# Patient Record
Sex: FEMALE | Race: AMERICAN INDIAN OR ALASKA NATIVE
[De-identification: names, ages, dates, MRNs, and addresses within clinical notes are randomized per-mention and may not be internally consistent; named-entity substitution may affect disease eponyms.]

---

## 2019-07-02 ENCOUNTER — HOSPITAL ENCOUNTER (EMERGENCY)
Dept: HOSPITAL 5 - ED | Age: 29
Discharge: HOME | End: 2019-07-02
Payer: COMMERCIAL

## 2019-07-02 VITALS — DIASTOLIC BLOOD PRESSURE: 78 MMHG | SYSTOLIC BLOOD PRESSURE: 128 MMHG

## 2019-07-02 DIAGNOSIS — O99.511: ICD-10-CM

## 2019-07-02 DIAGNOSIS — Z88.8: ICD-10-CM

## 2019-07-02 DIAGNOSIS — J45.909: ICD-10-CM

## 2019-07-02 DIAGNOSIS — O20.0: Primary | ICD-10-CM

## 2019-07-02 DIAGNOSIS — Z3A.01: ICD-10-CM

## 2019-07-02 LAB
BACTERIA #/AREA URNS HPF: (no result) /HPF
BASOPHILS # (AUTO): 0.2 K/MM3 (ref 0–0.1)
BASOPHILS NFR BLD AUTO: 2.8 % (ref 0–1.8)
BILIRUB UR QL STRIP: (no result)
BLOOD UR QL VISUAL: (no result)
EOSINOPHIL # BLD AUTO: 0.4 K/MM3 (ref 0–0.4)
EOSINOPHIL NFR BLD AUTO: 4.5 % (ref 0–4.3)
HCT VFR BLD CALC: 38.1 % (ref 30.3–42.9)
HGB BLD-MCNC: 13.4 GM/DL (ref 10.1–14.3)
LYMPHOCYTES # BLD AUTO: 1.8 K/MM3 (ref 1.2–5.4)
LYMPHOCYTES NFR BLD AUTO: 20.1 % (ref 13.4–35)
MCHC RBC AUTO-ENTMCNC: 35 % (ref 30–34)
MCV RBC AUTO: 94 FL (ref 79–97)
MONOCYTES # (AUTO): 0.7 K/MM3 (ref 0–0.8)
MONOCYTES % (AUTO): 7.4 % (ref 0–7.3)
MUCOUS THREADS #/AREA URNS HPF: (no result) /HPF
PH UR STRIP: 6 [PH] (ref 5–7)
PLATELET # BLD: 267 K/MM3 (ref 140–440)
PROT UR STRIP-MCNC: (no result) MG/DL
RBC # BLD AUTO: 4.04 M/MM3 (ref 3.65–5.03)
RBC #/AREA URNS HPF: 3 /HPF (ref 0–6)
UROBILINOGEN UR-MCNC: < 2 MG/DL (ref ?–2)
WBC #/AREA URNS HPF: 1 /HPF (ref 0–6)

## 2019-07-02 PROCEDURE — 86901 BLOOD TYPING SEROLOGIC RH(D): CPT

## 2019-07-02 PROCEDURE — 76817 TRANSVAGINAL US OBSTETRIC: CPT

## 2019-07-02 PROCEDURE — 76801 OB US < 14 WKS SINGLE FETUS: CPT

## 2019-07-02 PROCEDURE — 96372 THER/PROPH/DIAG INJ SC/IM: CPT

## 2019-07-02 PROCEDURE — 86870 RBC ANTIBODY IDENTIFICATION: CPT

## 2019-07-02 PROCEDURE — 81001 URINALYSIS AUTO W/SCOPE: CPT

## 2019-07-02 PROCEDURE — 86900 BLOOD TYPING SEROLOGIC ABO: CPT

## 2019-07-02 PROCEDURE — 36415 COLL VENOUS BLD VENIPUNCTURE: CPT

## 2019-07-02 PROCEDURE — 99284 EMERGENCY DEPT VISIT MOD MDM: CPT

## 2019-07-02 PROCEDURE — 84702 CHORIONIC GONADOTROPIN TEST: CPT

## 2019-07-02 PROCEDURE — 86850 RBC ANTIBODY SCREEN: CPT

## 2019-07-02 PROCEDURE — 85461 HEMOGLOBIN FETAL: CPT

## 2019-07-02 PROCEDURE — 85025 COMPLETE CBC W/AUTO DIFF WBC: CPT

## 2019-07-02 NOTE — ULTRASOUND REPORT
ULTRASOUND OBSTETRIC 



Indication:

preg with abd pain/bleeding. Reported estimated gestational age of 5 weeks 2 days.



Findings:

No intrauterine pregnancy or gestational sac is identified at this time.



The ovaries are normal. There is no free fluid.



Impression:

No intrauterine pregnancy identified. No significant abnormality of the visualized pelvic structures.
 Based on the reported estimated gestational age, follow-up ultrasound should be considered in one to
 2 weeks to reevaluate for intrauterine pregnancy.



Signer Name: Bjorn Rivero MD 

 Signed: 7/2/2019 6:13 PM 

 Workstation Name: [id number]

## 2019-07-02 NOTE — ULTRASOUND REPORT
ULTRASOUND OBSTETRIC 



Indication:

preg with abd pain/bleeding. Reported estimated gestational age of 5 weeks 2 days.



Findings:

No intrauterine pregnancy or gestational sac is identified at this time.



The ovaries are normal. There is no free fluid.



Impression:

No intrauterine pregnancy identified. No significant abnormality of the visualized pelvic structures.
 Based on the reported estimated gestational age, follow-up ultrasound should be considered in one to
 2 weeks to reevaluate for intrauterine pregnancy.



Signer Name: Bjorn Rivero MD 

 Signed: 7/2/2019 6:13 PM 

 Workstation Name: [id number]

## 2019-07-02 NOTE — EMERGENCY DEPARTMENT REPORT
Blank Doc





- Documentation


Documentation: 





This is a 28-year-old female that rpesents with pelvic cramping and vaginal bl

eeding. Stated is not sure how long of pregnancy.








This initial assessment/diagnostic orders/clinical plan/treatment(s) is/are 

subject to change based on patient's health status, clinical progression and re-

assessment by fellow clinical providers in the ED.  Further treatment and workup

at subsequent clinical providers discretion.  Patient/guardians urged not to 

elope from the ED as their condition may be serious if not clinically assessed 

and managed.  Initial orders include:


1- Patient sent to [facility] for further evaluation and treatment


2-labs


3- UA

## 2019-07-02 NOTE — EMERGENCY DEPARTMENT REPORT
<MARIAH FOWLER - Last Filed: 07/02/19 16:40>





ED Pregnancy HPI





- General


Chief complaint: Abdominal Pain


Stated complaint: PREGNANT/SPOTTING


Time Seen by Provider: 07/02/19 13:50


Source: patient


Mode of arrival: Ambulatory


Limitations: No Limitations





- History of Present Illness


Initial comments: 





Patient is a 28-year-old  female who is presenting with spotting for the 

last 3 days.  Patient states 2 weeks ago she did have a confirmed pregnancy.  

ACEs Quant was too low to see intrauterine pregnancy.  Patient believes her 

Quant was 198.  Patient states that she has some crampy low abdominal pain and 

spotting.  Patient states she feels some mild pressure.  Patient is concerned 

that she is having a miscarriage.  Her previous visit when she had her pregnancy

confirmed there was no bleeding.





- Related Data


                                    Allergies











Allergy/AdvReac Type Severity Reaction Status Date / Time


 


naproxen [From Aleve] Allergy  Swelling Verified 07/02/19 13:40














ED Review of Systems


Comment: All other systems reviewed and negative





ED Past Medical Hx





- Past Medical History


Previous Medical History?: Yes


Hx Asthma: Yes





- Surgical History


Past Surgical History?: Yes


Additional Surgical History: c section





- Social History


Smoking Status: Never Smoker


Substance Use Type: None





ED Physical Exam





- General


Limitations: No Limitations


General appearance: alert, in no apparent distress





- Head


Head exam: Present: atraumatic, normocephalic





- Eye


Eye exam: Present: normal appearance





- ENT


ENT exam: Present: mucous membranes moist





- Neck


Neck exam: Present: normal inspection





- Respiratory


Respiratory exam: Present: normal lung sounds bilaterally.  Absent: respiratory 

distress, wheezes, rales, rhonchi





- Cardiovascular


Cardiovascular Exam: Present: regular rate, normal rhythm.  Absent: systolic 

murmur, diastolic murmur, rubs, gallop





- GI/Abdominal


GI/Abdominal exam: Present: soft, tenderness, normal bowel sounds.  Absent: 

distended, guarding, rebound





- Extremities Exam


Extremities exam: Present: normal inspection





- Back Exam


Back exam: Present: normal inspection





- Neurological Exam


Neurological exam: Present: alert, oriented X3





- Psychiatric


Psychiatric exam: Present: normal affect, normal mood





- Skin


Skin exam: Present: warm, dry, intact, normal color.  Absent: rash





ED Medical Decision Making





- Lab Data


Result diagrams: 


                                 07/02/19 13:55











                                   Lab Results











  07/02/19 07/02/19 07/02/19 Range/Units





  13:55 13:55 13:55 


 


WBC  8.8    (4.5-11.0)  K/mm3


 


RBC  4.04    (3.65-5.03)  M/mm3


 


Hgb  13.4    (10.1-14.3)  gm/dl


 


Hct  38.1    (30.3-42.9)  %


 


MCV  94    (79-97)  fl


 


MCH  33 H    (28-32)  pg


 


MCHC  35 H    (30-34)  %


 


RDW  14.1    (13.2-15.2)  %


 


Plt Count  267    (140-440)  K/mm3


 


Lymph % (Auto)  20.1    (13.4-35.0)  %


 


Mono % (Auto)  7.4 H    (0.0-7.3)  %


 


Eos % (Auto)  4.5 H    (0.0-4.3)  %


 


Baso % (Auto)  2.8 H    (0.0-1.8)  %


 


Lymph #  1.8    (1.2-5.4)  K/mm3


 


Mono #  0.7    (0.0-0.8)  K/mm3


 


Eos #  0.4    (0.0-0.4)  K/mm3


 


Baso #  0.2 H    (0.0-0.1)  K/mm3


 


Seg Neutrophils %  65.2    (40.0-70.0)  %


 


Seg Neutrophils #  5.7    (1.8-7.7)  K/mm3


 


HCG, Quant   1955 H   (0-4)  mIU/mL


 


Urine Color     (Yellow)  


 


Urine Turbidity     (Clear)  


 


Urine pH     (5.0-7.0)  


 


Ur Specific Gravity     (1.003-1.030)  


 


Urine Protein     (Negative)  mg/dL


 


Urine Glucose (UA)     (Negative)  mg/dL


 


Urine Ketones     (Negative)  mg/dL


 


Urine Blood     (Negative)  


 


Urine Nitrite     (Negative)  


 


Urine Bilirubin     (Negative)  


 


Urine Urobilinogen     (<2.0)  mg/dL


 


Ur Leukocyte Esterase     (Negative)  


 


Urine WBC (Auto)     (0.0-6.0)  /HPF


 


Urine RBC (Auto)     (0.0-6.0)  /HPF


 


U Epithel Cells (Auto)     (0-13.0)  /HPF


 


Urine Bacteria (Auto)     (Negative)  /HPF


 


Urine Mucus     /HPF


 


Blood Type    A NEGATIVE  


 


Antibody Screen    Positive  














  07/02/19 Range/Units





  Unknown 


 


WBC   (4.5-11.0)  K/mm3


 


RBC   (3.65-5.03)  M/mm3


 


Hgb   (10.1-14.3)  gm/dl


 


Hct   (30.3-42.9)  %


 


MCV   (79-97)  fl


 


MCH   (28-32)  pg


 


MCHC   (30-34)  %


 


RDW   (13.2-15.2)  %


 


Plt Count   (140-440)  K/mm3


 


Lymph % (Auto)   (13.4-35.0)  %


 


Mono % (Auto)   (0.0-7.3)  %


 


Eos % (Auto)   (0.0-4.3)  %


 


Baso % (Auto)   (0.0-1.8)  %


 


Lymph #   (1.2-5.4)  K/mm3


 


Mono #   (0.0-0.8)  K/mm3


 


Eos #   (0.0-0.4)  K/mm3


 


Baso #   (0.0-0.1)  K/mm3


 


Seg Neutrophils %   (40.0-70.0)  %


 


Seg Neutrophils #   (1.8-7.7)  K/mm3


 


HCG, Quant   (0-4)  mIU/mL


 


Urine Color  Yellow  (Yellow)  


 


Urine Turbidity  Slightly-cloudy  (Clear)  


 


Urine pH  6.0  (5.0-7.0)  


 


Ur Specific Gravity  1.020  (1.003-1.030)  


 


Urine Protein  <15 mg/dl  (Negative)  mg/dL


 


Urine Glucose (UA)  Neg  (Negative)  mg/dL


 


Urine Ketones  Neg  (Negative)  mg/dL


 


Urine Blood  Neg  (Negative)  


 


Urine Nitrite  Neg  (Negative)  


 


Urine Bilirubin  Neg  (Negative)  


 


Urine Urobilinogen  < 2.0  (<2.0)  mg/dL


 


Ur Leukocyte Esterase  Tr  (Negative)  


 


Urine WBC (Auto)  1.0  (0.0-6.0)  /HPF


 


Urine RBC (Auto)  3.0  (0.0-6.0)  /HPF


 


U Epithel Cells (Auto)  8.0  (0-13.0)  /HPF


 


Urine Bacteria (Auto)  1+  (Negative)  /HPF


 


Urine Mucus  Few  /HPF


 


Blood Type   


 


Antibody Screen   














ED Disposition


Clinical Impression: 


 Threatened miscarriage, Vaginal bleeding affecting early pregnancy





Disposition: DC-01 TO HOME OR SELFCARE


Condition: Stable


Instructions:  Abdominal Pain (ED), Threatened Miscarriage (ED)


Additional Instructions: 


Have repeat hCG quant labs in 48 hours with OB/GYN or ER.





Your hCG quantitative on this visit was 1955.





Remain on bed rest.





Follow up with OB/GYN in 24-48 hours.





Return to ER if increased vaginal bleeding, abdominal pain, and low back pain.


Referrals: 


[facility] [Primary Care Provider] - 3-5 Days


[facility] [Provider Group] - 3-5 Days


[facility] [Provider Group] - 3-5 Days


[facility] [Provider Group] - 3-5 Days


Forms:  Work/School Release Form(ED)





<ELINA WANG - Last Filed: 07/02/19 19:49>





ED Review of Systems


ROS: 


Stated complaint: PREGNANT/SPOTTING


Other details as noted in HPI








ED Course





                                   Vital Signs











  07/02/19 07/02/19





  13:47 17:54


 


Temperature 98.2 F 98.0 F


 


Pulse Rate 89 84


 


Respiratory 16 14





Rate  


 


Blood Pressure 101/52 


 


Blood Pressure  128/78





[Right]  


 


O2 Sat by Pulse 99 98





Oximetry  














ED Medical Decision Making





- Lab Data


Result diagrams: 


                                 07/02/19 13:55








- Radiology Data


Radiology results: report reviewed





ULTRASOUND OBSTETRIC 





Indication: 


preg with abd pain/bleeding. Reported estimated gestational age of 5 weeks 2 

days. 





Findings: 


No intrauterine pregnancy or gestational sac is identified at this time. 





The ovaries are normal. There is no free fluid. 





Impression: 


No intrauterine pregnancy identified. No significant abnormality of the 

visualized pelvic 


structures. Based on the reported estimated gestational age, follow-up 

ultrasound should be 


considered in one to 2 weeks to reevaluate for intrauterine pregnancy. 








- Medical Decision Making





This patient was received from [facility] at 1700 pending OB ultrasound.  I 

have review ultrasound report.  Her findings of No intrauterine pregnancy 

identified. No significant abnormality of the visualized pelvic structures. 

Based on the reported estimated gestational age, follow-up ultrasound should be 


considered in one to 2 weeks to reevaluate for intrauterine pregnancy.  

Patient's hCG quantitative is 1955 she is Rh-.  She was given RhoGAM treatment. 

 She was instructed to have repeat levels and possible ultrasound with OB/GYN or

 return to the emergency room.


Critical care attestation.: 


If time is entered above; I have spent that time in minutes in the direct care 

of this critically ill patient, excluding procedure time.








ED Disposition


Is pt being admited?: No


Does the pt Need Aspirin: No


Time of Disposition: 19:49

## 2019-07-18 ENCOUNTER — HOSPITAL ENCOUNTER (EMERGENCY)
Dept: HOSPITAL 5 - ED | Age: 29
Discharge: HOME | End: 2019-07-18
Payer: COMMERCIAL

## 2019-07-18 VITALS — DIASTOLIC BLOOD PRESSURE: 51 MMHG | SYSTOLIC BLOOD PRESSURE: 113 MMHG

## 2019-07-18 DIAGNOSIS — J45.909: ICD-10-CM

## 2019-07-18 DIAGNOSIS — O99.331: ICD-10-CM

## 2019-07-18 DIAGNOSIS — O20.0: Primary | ICD-10-CM

## 2019-07-18 DIAGNOSIS — O21.9: ICD-10-CM

## 2019-07-18 DIAGNOSIS — Z3A.01: ICD-10-CM

## 2019-07-18 DIAGNOSIS — O99.511: ICD-10-CM

## 2019-07-18 DIAGNOSIS — F17.200: ICD-10-CM

## 2019-07-18 DIAGNOSIS — Z88.5: ICD-10-CM

## 2019-07-18 LAB
BASOPHILS # (AUTO): 0.1 K/MM3 (ref 0–0.1)
BASOPHILS NFR BLD AUTO: 1.1 % (ref 0–1.8)
BILIRUB UR QL STRIP: (no result)
BLOOD UR QL VISUAL: (no result)
EOSINOPHIL # BLD AUTO: 0.4 K/MM3 (ref 0–0.4)
EOSINOPHIL NFR BLD AUTO: 4.6 % (ref 0–4.3)
HCT VFR BLD CALC: 40 % (ref 30.3–42.9)
HGB BLD-MCNC: 13.8 GM/DL (ref 10.1–14.3)
LYMPHOCYTES # BLD AUTO: 1.9 K/MM3 (ref 1.2–5.4)
LYMPHOCYTES NFR BLD AUTO: 23.8 % (ref 13.4–35)
MCHC RBC AUTO-ENTMCNC: 35 % (ref 30–34)
MCV RBC AUTO: 95 FL (ref 79–97)
MONOCYTES # (AUTO): 0.6 K/MM3 (ref 0–0.8)
MONOCYTES % (AUTO): 7.3 % (ref 0–7.3)
PH UR STRIP: 7 [PH] (ref 5–7)
PLATELET # BLD: 242 K/MM3 (ref 140–440)
PROT UR STRIP-MCNC: (no result) MG/DL
RBC # BLD AUTO: 4.22 M/MM3 (ref 3.65–5.03)
RBC #/AREA URNS HPF: 1 /HPF (ref 0–6)
UROBILINOGEN UR-MCNC: < 2 MG/DL (ref ?–2)
WBC #/AREA URNS HPF: 1 /HPF (ref 0–6)

## 2019-07-18 PROCEDURE — 86870 RBC ANTIBODY IDENTIFICATION: CPT

## 2019-07-18 PROCEDURE — 96372 THER/PROPH/DIAG INJ SC/IM: CPT

## 2019-07-18 PROCEDURE — 76801 OB US < 14 WKS SINGLE FETUS: CPT

## 2019-07-18 PROCEDURE — 85025 COMPLETE CBC W/AUTO DIFF WBC: CPT

## 2019-07-18 PROCEDURE — 76817 TRANSVAGINAL US OBSTETRIC: CPT

## 2019-07-18 PROCEDURE — 86900 BLOOD TYPING SEROLOGIC ABO: CPT

## 2019-07-18 PROCEDURE — 81001 URINALYSIS AUTO W/SCOPE: CPT

## 2019-07-18 PROCEDURE — 84702 CHORIONIC GONADOTROPIN TEST: CPT

## 2019-07-18 PROCEDURE — 86901 BLOOD TYPING SEROLOGIC RH(D): CPT

## 2019-07-18 PROCEDURE — 86850 RBC ANTIBODY SCREEN: CPT

## 2019-07-18 PROCEDURE — 36415 COLL VENOUS BLD VENIPUNCTURE: CPT

## 2019-07-18 NOTE — EVENT NOTE
ED Screening Note


ED Screening Note: 





pt presents with vaginal spotting


no dysuria


lower back pain and pelvic pain


pt is currently 7 weeks pregnant 


does not have a OB


/P:1/A:2


PMHx asthma 


allergy: aleve and zofran 


LNMP: May 26th 


pt is taking a prenatal vitamin 





+ smoker before pregnancy


+drinker before pregnancy 


no drug use








This initial assessment/diagnostic orders/clinical plan/treatment(s) is/are 

subject to change based on patients health status, clinical progression and re-

assessment by fellow clinical providers in the ED. Further treatment and workup 

at subsequent clinical providers discretion. Patient/guardian urged not to elope

from the ED as their condition may be serious if not clinically assessed and 

managed. 





Initial orders include: labs, UA, US

## 2019-07-18 NOTE — ULTRASOUND REPORT
ULTRASOUND OBSTETRIC 



Indication:

7 weeks preg, vag bleeding



COMPARISON: 7/2/2019



Findings: Transabdominal and transvaginal imaging is performed.

There is a single, living intrauterine pregnancy. 



Crown-rump length = 0.9 cm = 7 weeks, 0 day(s).



Fetal heart rate is 138 beats per minute.



The gestational sac and fetal pole or in the lower uterine segment.



Right ovary is normal in appearance. There is a 2.7 cm complex cyst in the left ovary.



There is no free fluid.



Impression:

Single, living intrauterine pregnancy with estimated sonographic age of 7 weeks, 0 day(s).



The gestational sac is in the lower uterine segment. Continued close follow-up is suggested.



Signer Name: Vidal Pina MD 

Signed: 7/18/2019 4:44 PM

 Workstation Name: [id number]

## 2019-07-18 NOTE — EMERGENCY DEPARTMENT REPORT
ED Pregnancy HPI





- General


Chief complaint: Vaginal Bleeding


Stated complaint: VAG BLEEDING/PELVIC/BACK PAIN (PREG)


Time Seen by Provider: 19 14:46


Source: patient


Mode of arrival: Ambulatory


Limitations: No Limitations





- History of Present Illness


Initial comments: 





This is a 29-year-old American female presents to the emergency room with 

vaginal bleeding and pelvic pain for 3 days.  Has medical history of asthma.  

Patient reports last menstrual period was 2019,  83 miscarriages.  

Patient states symptoms started with sharp pelvic pain radiating to her back.  

She reports spotting with small tissue with wipes.  She is 7 weeks pregnant 

which was confirmed by a hospital 1-2 weeks ago.  Patient states she is not 

followed by an OB/GYN at this time.  He also reports nausea or vomiting.  She 

denies vaginal discharge, any urinary frequency, urgency, or dysuria.


MD Complaint: abdominal pain, vaginal bleeding


Onset/Timing: 3


-: days(s)


Location: pelvis


Radiation: back


Severity: moderate


Severity scale (0 -10): 4


Quality: sharp


Consistency: intermittent


Improves with: none


Worsens with: none


Associated symptoms: nausea/vomiting, vaginal bleeding, abdominal pain.  denies:

vaginal discharge, headache, vision changes, malaise, dysparuenia, rash, 

shortness of breath, syncope, weakness


Vaginal bleeding: light


Pregnant:: Yes


Number of weeks pregnant: 7


OB History - Current Pregnancy: no complications


OB History - Previous Pregnancies: miscarriage


Last menstrual period: 19


Pre- care: none





- Related Data


: 4


Para: 1


Ab: 3 (miscarriages)


                                  Previous Rx's











 Medication  Instructions  Recorded  Last Taken  Type


 


Doxylamine Succinate [Unisom] 25 mg PO HS #30 tablet 19 Unknown Rx


 


Prenatal 21/Iron Fu/Folic Acid 1 each PO DAILY #30 tablet 19 Unknown Rx





[Prenatal Complete Caplet]    


 


Pyridoxine HCl (Vitamin B6) 25 mg PO QHS #30 tablet 19 Unknown Rx





[Pyridoxine HCl]    











                                    Allergies











Allergy/AdvReac Type Severity Reaction Status Date / Time


 


naproxen [From Aleve] Allergy  Swelling Verified 19 13:40


 


ondansetron [From Zofran] Allergy  Swelling Verified 19 14:29














ED Review of Systems


ROS: 


Stated complaint: VAG BLEEDING/PELVIC/BACK PAIN (PREG)


Other details as noted in HPI





Constitutional: denies: chills, fever


Respiratory: denies: cough, shortness of breath, wheezing


Cardiovascular: denies: chest pain, palpitations


Gastrointestinal: abdominal pain, nausea, vomiting.  denies: diarrhea


Genitourinary: other (bleeding during pregnancy).  denies: urgency, dysuria, 

discharge


Musculoskeletal: back pain.  denies: joint swelling, arthralgia


Skin: denies: rash, lesions


Neurological: denies: headache, weakness, paresthesias


Psychiatric: denies: anxiety, depression


Hematological/Lymphatic: denies: easy bleeding, easy bruising





ED Past Medical Hx





- Past Medical History


Previous Medical History?: Yes


Hx Asthma: Yes





- Surgical History


Past Surgical History?: Yes


Additional Surgical History: c section





- Social History


Smoking Status: Heavy Tobacco Smoker


Substance Use Type: None





- Medications


Home Medications: 


                                Home Medications











 Medication  Instructions  Recorded  Confirmed  Last Taken  Type


 


Doxylamine Succinate [Unisom] 25 mg PO HS #30 tablet 19  Unknown Rx


 


Prenatal 21/Iron Fu/Folic Acid 1 each PO DAILY #30 tablet 19  Unknown Rx





[Prenatal Complete Caplet]     


 


Pyridoxine HCl (Vitamin B6) 25 mg PO QHS #30 tablet 19  Unknown Rx





[Pyridoxine HCl]     














ED Physical Exam





- General


Limitations: No Limitations


General appearance: alert, in no apparent distress, obese (morbidly)





- Respiratory


Respiratory exam: Present: normal lung sounds bilaterally.  Absent: respiratory 

distress





- Cardiovascular


Cardiovascular Exam: Present: regular rate, normal rhythm.  Absent: systolic 

murmur, diastolic murmur, rubs, gallop





- GI/Abdominal


GI/Abdominal exam: Present: soft, normal bowel sounds.  Absent: distended, 

tenderness, guarding, rebound, rigid





- Back Exam


Back exam: Absent: CVA tenderness (R), CVA tenderness (L)





- Neurological Exam


Neurological exam: Present: alert, oriented X3, normal gait





- Psychiatric


Psychiatric exam: Present: normal affect, normal mood





- Skin


Skin exam: Present: warm, dry, intact, normal color.  Absent: rash





ED Course





                                   Vital Signs











  19





  14:46


 


Temperature 98.2 F


 


Pulse Rate 94 H


 


Respiratory 16





Rate 


 


Blood Pressure 125/70


 


O2 Sat by Pulse 98





Oximetry 














ED Medical Decision Making





- Lab Data


Result diagrams: 


                                 19 15:21











                                   Lab Results











  19/19 Range/Units





  15:21 15:21 16:00 


 


WBC  8.0    (4.5-11.0)  K/mm3


 


RBC  4.22    (3.65-5.03)  M/mm3


 


Hgb  13.8    (10.1-14.3)  gm/dl


 


Hct  40.0    (30.3-42.9)  %


 


MCV  95    (79-97)  fl


 


MCH  33 H    (28-32)  pg


 


MCHC  35 H    (30-34)  %


 


RDW  14.2    (13.2-15.2)  %


 


Plt Count  242    (140-440)  K/mm3


 


Lymph % (Auto)  23.8    (13.4-35.0)  %


 


Mono % (Auto)  7.3    (0.0-7.3)  %


 


Eos % (Auto)  4.6 H    (0.0-4.3)  %


 


Baso % (Auto)  1.1    (0.0-1.8)  %


 


Lymph #  1.9    (1.2-5.4)  K/mm3


 


Mono #  0.6    (0.0-0.8)  K/mm3


 


Eos #  0.4    (0.0-0.4)  K/mm3


 


Baso #  0.1    (0.0-0.1)  K/mm3


 


Seg Neutrophils %  63.2    (40.0-70.0)  %


 


Seg Neutrophils #  5.1    (1.8-7.7)  K/mm3


 


HCG, Quant   11122 H   (0-4)  mIU/mL


 


Urine Color     (Yellow)  


 


Urine Turbidity     (Clear)  


 


Urine pH     (5.0-7.0)  


 


Ur Specific Gravity     (1.003-1.030)  


 


Urine Protein     (Negative)  mg/dL


 


Urine Glucose (UA)     (Negative)  mg/dL


 


Urine Ketones     (Negative)  mg/dL


 


Urine Blood     (Negative)  


 


Urine Nitrite     (Negative)  


 


Urine Bilirubin     (Negative)  


 


Urine Urobilinogen     (<2.0)  mg/dL


 


Ur Leukocyte Esterase     (Negative)  


 


Urine WBC (Auto)     (0.0-6.0)  /HPF


 


Urine RBC (Auto)     (0.0-6.0)  /HPF


 


U Epithel Cells (Auto)     (0-13.0)  /HPF


 


Blood Type    A NEGATIVE  














  19 Range/Units





  16:28 


 


WBC   (4.5-11.0)  K/mm3


 


RBC   (3.65-5.03)  M/mm3


 


Hgb   (10.1-14.3)  gm/dl


 


Hct   (30.3-42.9)  %


 


MCV   (79-97)  fl


 


MCH   (28-32)  pg


 


MCHC   (30-34)  %


 


RDW   (13.2-15.2)  %


 


Plt Count   (140-440)  K/mm3


 


Lymph % (Auto)   (13.4-35.0)  %


 


Mono % (Auto)   (0.0-7.3)  %


 


Eos % (Auto)   (0.0-4.3)  %


 


Baso % (Auto)   (0.0-1.8)  %


 


Lymph #   (1.2-5.4)  K/mm3


 


Mono #   (0.0-0.8)  K/mm3


 


Eos #   (0.0-0.4)  K/mm3


 


Baso #   (0.0-0.1)  K/mm3


 


Seg Neutrophils %   (40.0-70.0)  %


 


Seg Neutrophils #   (1.8-7.7)  K/mm3


 


HCG, Quant   (0-4)  mIU/mL


 


Urine Color  Yellow  (Yellow)  


 


Urine Turbidity  Slightly-cloudy  (Clear)  


 


Urine pH  7.0  (5.0-7.0)  


 


Ur Specific Gravity  1.018  (1.003-1.030)  


 


Urine Protein  <15 mg/dl  (Negative)  mg/dL


 


Urine Glucose (UA)  Neg  (Negative)  mg/dL


 


Urine Ketones  Neg  (Negative)  mg/dL


 


Urine Blood  Neg  (Negative)  


 


Urine Nitrite  Neg  (Negative)  


 


Urine Bilirubin  Neg  (Negative)  


 


Urine Urobilinogen  < 2.0  (<2.0)  mg/dL


 


Ur Leukocyte Esterase  Neg  (Negative)  


 


Urine WBC (Auto)  1.0  (0.0-6.0)  /HPF


 


Urine RBC (Auto)  1.0  (0.0-6.0)  /HPF


 


U Epithel Cells (Auto)  4.0  (0-13.0)  /HPF


 


Blood Type   














- Radiology Data


Radiology results: report reviewed





ULTRASOUND OBSTETRIC 





Indication: 


7 weeks preg, vag bleeding 





COMPARISON: 2019 





Findings: Transabdominal and transvaginal imaging is performed. 


There is a single, living intrauterine pregnancy. 





Crown-rump length = 0.9 cm = 7 weeks, 0 day(s). 





Fetal heart rate is 138 beats per minute. 





The gestational sac and fetal pole or in the lower uterine segment. 





Right ovary is normal in appearance. There is a 2.7 cm complex cyst in the left 

ovary. 





There is no free fluid. 





Impression: 


Single, living intrauterine pregnancy with estimated sonographic age of 7 weeks,

0 day(s). 





The gestational sac is in the lower uterine segment. Continued close follow-up 

is suggested. 





- Medical Decision Making





Patient was examined by this provider.  Vitals are normal and patient is in no 

acute distress.  Given her Reglan while in the ER.  Obtained a urinalysis, CBC, 

hCG quant, and OB ultrasound.  Quant [id number] all other labs unremarkable.  Single,

living intrauterine pregnancy with estimated sonographic age of 7 weeks, 0 

day(s). The gestational sac is in the lower uterine segment. Continued close 

follow-up is suggested. Patient instructed to follow up with OB/GYN.  Referral 

to OB/GYN for follow-up.  Start diclegis and prenatal vitamins.  Patient 

discharged home in stable condition. 


Critical care attestation.: 


If time is entered above; I have spent that time in minutes in the direct care 

of this critically ill patient, excluding procedure time.








ED Disposition


Clinical Impression: 


 Vaginal bleeding affecting early pregnancy, Threatened miscarriage





Disposition: DC-01 TO HOME OR SELFCARE


Is pt being admited?: No


Does the pt Need Aspirin: No


Condition: Stable


Instructions:  Threatened Miscarriage (ED), Pregnancy (ED)


Additional Instructions: 


ER hCG quantitative on this visit was 41152.





Remain on bed rest.





Follow up with OB/GYN in 3-5 days.





Return to ER if increased vaginal bleeding, abdominal pain, and low back pain.


Prescriptions: 


Pyridoxine HCl (Vitamin B6) [Pyridoxine HCl] 25 mg PO QHS #30 tablet


Prenatal 21/Iron Fu/Folic Acid [Prenatal Complete Caplet] 1 each PO DAILY #30 

tablet


Doxylamine Succinate [Unisom] 25 mg PO HS #30 tablet


Referrals: 


[facility] [Outside] - 3-5 Days


[facility] [Provider Group] - 3-5 Days


[facility] [Provider Group] - 3-5 Days


Forms:  Work/School Release Form(ED)


Time of Disposition: 17:54

## 2019-07-18 NOTE — ULTRASOUND REPORT
ULTRASOUND OBSTETRIC 



Indication:

7 weeks preg, vag bleeding



COMPARISON: 7/2/2019



Findings: Transabdominal and transvaginal imaging is performed.

There is a single, living intrauterine pregnancy. 



Crown-rump length = 0.9 cm = 7 weeks, 0 day(s).



Fetal heart rate is 138 beats per minute.



The gestational sac and fetal pole or in the lower uterine segment.



Right ovary is normal in appearance. There is a 2.7 cm complex cyst in the left ovary.



There is no free fluid.



Impression:

Single, living intrauterine pregnancy with estimated sonographic age of 7 weeks, 0 day(s).



The gestational sac is in the lower uterine segment. Continued close follow-up is suggested.



Signer Name: Vidal Pina MD 

Signed: 7/18/2019 4:44 PM

 Workstation Name: [id number]